# Patient Record
Sex: FEMALE | Race: WHITE | NOT HISPANIC OR LATINO
[De-identification: names, ages, dates, MRNs, and addresses within clinical notes are randomized per-mention and may not be internally consistent; named-entity substitution may affect disease eponyms.]

---

## 2017-11-14 ENCOUNTER — APPOINTMENT (OUTPATIENT)
Dept: SURGICAL ONCOLOGY | Facility: CLINIC | Age: 57
End: 2017-11-14
Payer: MEDICAID

## 2017-11-14 VITALS
HEIGHT: 62 IN | SYSTOLIC BLOOD PRESSURE: 114 MMHG | DIASTOLIC BLOOD PRESSURE: 80 MMHG | HEART RATE: 71 BPM | TEMPERATURE: 98.4 F | WEIGHT: 136 LBS | BODY MASS INDEX: 25.03 KG/M2

## 2017-11-14 DIAGNOSIS — L72.9 FOLLICULAR CYST OF THE SKIN AND SUBCUTANEOUS TISSUE, UNSPECIFIED: ICD-10-CM

## 2017-11-14 DIAGNOSIS — Z78.9 OTHER SPECIFIED HEALTH STATUS: ICD-10-CM

## 2017-11-14 DIAGNOSIS — Z80.7 FAMILY HISTORY OF OTHER MALIGNANT NEOPLASMS OF LYMPHOID, HEMATOPOIETIC AND RELATED TISSUES: ICD-10-CM

## 2017-11-14 DIAGNOSIS — G54.2 CERVICAL ROOT DISORDERS, NOT ELSEWHERE CLASSIFIED: ICD-10-CM

## 2017-11-14 PROCEDURE — 99205 OFFICE O/P NEW HI 60 MIN: CPT

## 2017-11-22 ENCOUNTER — RESULT REVIEW (OUTPATIENT)
Age: 57
End: 2017-11-22

## 2017-11-22 ENCOUNTER — LABORATORY RESULT (OUTPATIENT)
Age: 57
End: 2017-11-22

## 2017-11-22 ENCOUNTER — APPOINTMENT (OUTPATIENT)
Dept: SURGICAL ONCOLOGY | Facility: CLINIC | Age: 57
End: 2017-11-22
Payer: MEDICAID

## 2017-11-22 PROCEDURE — 21557 RESECT NECK THORAX TUMOR<5CM: CPT

## 2017-11-22 PROCEDURE — 14000 TIS TRNFR TRUNK 10 SQ CM/<: CPT

## 2017-11-22 PROCEDURE — 11600 EXC TR-EXT MAL+MARG 0.5 CM/<: CPT

## 2017-12-05 ENCOUNTER — APPOINTMENT (OUTPATIENT)
Dept: SURGICAL ONCOLOGY | Facility: CLINIC | Age: 57
End: 2017-12-05
Payer: MEDICAID

## 2017-12-05 VITALS
OXYGEN SATURATION: 97 % | HEART RATE: 83 BPM | DIASTOLIC BLOOD PRESSURE: 76 MMHG | HEIGHT: 62 IN | WEIGHT: 138 LBS | BODY MASS INDEX: 25.4 KG/M2 | SYSTOLIC BLOOD PRESSURE: 116 MMHG | TEMPERATURE: 98.3 F

## 2017-12-05 DIAGNOSIS — D22.9 MELANOCYTIC NEVI, UNSPECIFIED: ICD-10-CM

## 2017-12-05 PROCEDURE — 99214 OFFICE O/P EST MOD 30 MIN: CPT | Mod: 24

## 2019-03-11 ENCOUNTER — APPOINTMENT (OUTPATIENT)
Dept: SPINE | Facility: CLINIC | Age: 59
End: 2019-03-11
Payer: MEDICAID

## 2019-03-11 VITALS
DIASTOLIC BLOOD PRESSURE: 76 MMHG | HEIGHT: 62 IN | HEART RATE: 69 BPM | WEIGHT: 138 LBS | BODY MASS INDEX: 25.4 KG/M2 | SYSTOLIC BLOOD PRESSURE: 115 MMHG

## 2019-03-11 DIAGNOSIS — Z82.49 FAMILY HISTORY OF ISCHEMIC HEART DISEASE AND OTHER DISEASES OF THE CIRCULATORY SYSTEM: ICD-10-CM

## 2019-03-11 DIAGNOSIS — M54.2 CERVICALGIA: ICD-10-CM

## 2019-03-11 DIAGNOSIS — Z84.89 FAMILY HISTORY OF OTHER SPECIFIED CONDITIONS: ICD-10-CM

## 2019-03-11 DIAGNOSIS — M54.9 DORSALGIA, UNSPECIFIED: ICD-10-CM

## 2019-03-11 PROCEDURE — 99203 OFFICE O/P NEW LOW 30 MIN: CPT

## 2019-03-11 RX ORDER — PREGABALIN 50 MG/1
50 CAPSULE ORAL
Refills: 0 | Status: ACTIVE | COMMUNITY

## 2019-03-11 NOTE — ASSESSMENT
[FreeTextEntry1] : Cervical and lumbar axial pain without significant radiculopathy. Normal neurological exam. Incidental lower cervical upper thoracic syrinx. No need for any surgical intervention at the present time. She will return if she develops any significant numbness, tingling, weakness or gait disturbance. Regular exercise would be helpful.

## 2019-03-11 NOTE — REVIEW OF SYSTEMS
[Arthralgias] : arthralgias [Joint Pain] : joint pain [As Noted in HPI] : as noted in HPI [Itching] : itching [Negative] : Heme/Lymph [de-identified] : Headaches [FreeTextEntry9] : weakness [de-identified] : karen.

## 2019-03-11 NOTE — PHYSICAL EXAM
[Person] : oriented to person [Place] : oriented to place [Time] : oriented to time [Short Term Intact] : short term memory intact [Remote Intact] : remote memory intact [Span Intact] : the attention span was normal [Concentration Intact] : normal concentrating ability [Fluency] : fluency intact [Comprehension] : comprehension intact [Current Events] : adequate knowledge of current events [Past History] : adequate knowledge of personal past history [Vocabulary] : adequate range of vocabulary [Cranial Nerves Optic (II)] : visual acuity intact bilaterally,  pupils equal round and reactive to light [Cranial Nerves Oculomotor (III)] : extraocular motion intact [Cranial Nerves Trigeminal (V)] : facial sensation intact symmetrically [Cranial Nerves Facial (VII)] : face symmetrical [Cranial Nerves Vestibulocochlear (VIII)] : hearing was intact bilaterally [Cranial Nerves Glossopharyngeal (IX)] : tongue and palate midline [Cranial Nerves Accessory (XI - Cranial And Spinal)] : head turning and shoulder shrug symmetric [Cranial Nerves Hypoglossal (XII)] : there was no tongue deviation with protrusion [Motor Tone] : muscle tone was normal in all four extremities [Motor Strength] : muscle strength was normal in all four extremities [No Muscle Atrophy] : normal bulk in all four extremities [Sensation Tactile Decrease] : light touch was intact [Abnormal Walk] : normal gait [Balance] : balance was intact [Past-pointing] : there was no past-pointing [Tremor] : no tremor present [2+] : Ankle jerk left 2+ [Plantar Reflex Right Only] : normal on the right [Plantar Reflex Left Only] : normal on the left [___] : absent on the right [___] : absent on the left [Cavanaugh] : Cavanaugh's sign was not demonstrated [L'Hermitte's] : neck flexion did not produce tingling down the spine/arms [Spurling's - Opposite Side] : Negative Spurling's on opposite side [Spurling's Same Side] : Negative Spurling's on same side [No Tenderness to Palpation] : no spine tenderness on palpation [Straight-Leg Raise Test - Left] : straight leg raise of the left leg was negative [Straight-Leg Raise Test - Right] : straight leg raise  of the right leg was negative [Able to toe walk] : the patient was able to toe walk [Able to heel walk] : the patient was able to heel walk

## 2019-03-11 NOTE — DATA REVIEWED
[de-identified] : Cervical done at [facility] on 2/11/2019.  [place] [facility] [place]

## 2019-03-11 NOTE — HISTORY OF PRESENT ILLNESS
[> 3 months] : more  than 3 months [FreeTextEntry1] : neck and back pain [de-identified] : A 59-year-old woman who has had a year and a half of neck and back pain. There is no history of trauma. She has rare numbness and tingling in the right hand. She does not describe any weakness, bowel, bladder or gait disturbance. EMG testing showed cervical radiculopathy. She has not had any physical therapy. An MRI scan of the cervical spine showed C5-6 disc osteophyte complex with mild central and foraminal stenosis. There is no significant cord compression. It also showed an upper thoracic lower cervical syrinx which is non-expansile. There is no Chiari malformation. An MRI of the thoracic spine shows degenerative changes at multiple levels with a questionable arachnoid cyst that T3-4. There is no enhancement with contrast administration.Her symptoms have been significantly benefited her Lyrica.

## 2019-03-11 NOTE — CONSULT LETTER
[Dear  ___] : Dear  [unfilled], [Consult Letter:] : I had the pleasure of evaluating your patient, [unfilled]. [Please see my note below.] : Please see my note below. [Consult Closing:] : Thank you very much for allowing me to participate in the care of this patient.  If you have any questions, please do not hesitate to contact me. [Sincerely,] : Sincerely, [FreeTextEntry3] : Yifan Roberson M.D.

## 2019-03-11 NOTE — REASON FOR VISIT
[New Patient Visit] : a new patient visit [Referred By: _________] : Patient was referred by [facility] [FreeTextEntry1] : The patientstated that she started with neck and thoracic pain a year and a half ago.  She was sent for physical therapy of her cervical spine.  She saw DR. Pricila Fernandez who did EMGs of her upper extremities and ordered MRIs of the cervical and thoracic spine which she is here for review.  She was found to have a small syrinx at C4-C5.

## 2019-03-25 ENCOUNTER — TRANSCRIPTION ENCOUNTER (OUTPATIENT)
Age: 59
End: 2019-03-25

## 2021-02-03 ENCOUNTER — EMERGENCY (EMERGENCY)
Facility: HOSPITAL | Age: 61
LOS: 1 days | Discharge: HOME | End: 2021-02-03
Attending: EMERGENCY MEDICINE
Payer: COMMERCIAL

## 2021-02-03 VITALS
HEIGHT: 62 IN | TEMPERATURE: 99 F | RESPIRATION RATE: 18 BRPM | HEART RATE: 86 BPM | SYSTOLIC BLOOD PRESSURE: 162 MMHG | OXYGEN SATURATION: 97 % | WEIGHT: 154.32 LBS | DIASTOLIC BLOOD PRESSURE: 96 MMHG

## 2021-02-03 PROCEDURE — 99282 EMERGENCY DEPT VISIT SF MDM: CPT

## 2021-02-03 NOTE — ED ADULT NURSE NOTE - CAS ELECT INFOMATION PROVIDED
seen, treated, and released by Dr. Watkins in intake. No nursing intervention required./DC instructions

## 2021-02-03 NOTE — ED PROVIDER NOTE - CLINICAL SUMMARY MEDICAL DECISION MAKING FREE TEXT BOX
61 year old female presenting with bilateral foot pain and swelling. Exam and history more consistent with venous stasis even if there was a clot in the foot area. Those are not concerning for DVT/PE. Will instruct the patient to elevate the extremity as needed and to do exercises while sitting and follow up with a vascular doctor.

## 2021-02-03 NOTE — ED ADULT TRIAGE NOTE - CHIEF COMPLAINT QUOTE
Presents for BLE swelling since last night, R worse than left. Denies cp, sob. Was covid positive in 05/2020

## 2021-02-03 NOTE — ED PROVIDER NOTE - NSFOLLOWUPINSTRUCTIONS_ED_ALL_ED_FT
Venous Insufficiency    WHAT YOU NEED TO KNOW:    What is venous insufficiency? Venous insufficiency is a condition that prevents blood from flowing out of your legs and back to your heart. Veins contain valves that help blood flow in one direction. Venous insufficiency means the valves do not close correctly or fully. Blood flows back and pools in your leg. This can cause problems such as varicose veins. Venous insufficiency may also be called chronic venous insufficiency or venous stasis.    What increases my risk for venous insufficiency?   •A leg injury or blood clot      •Being a woman      •Pregnancy      •Older age      •A family history of varicose veins      •Smoking cigarettes      •Obesity, or not getting enough exercise      What are the signs and symptoms of venous insufficiency?   •Visible veins on your legs that may be small and red or large, thick, and blue      •Swelling in your ankles or calves      •Changes in skin color, such as dark or purple skin      •An ulcer (open sore) on your leg      •Leg pain that is worse when you are menstruating (women) or when you stand, and better when you elevate your legs      •Burning or itching      •Cramps that happen at night      •Thick, hard skin on your legs and ankles      •Feeling of heaviness in your legs      How is venous insufficiency diagnosed?   •Venous duplex imaging is a procedure used to examine the blood flow through veins. A gel will be applied to your legs. Your healthcare provider will slide a small device called a transducer across the veins. The transducer is a microphone that helps your healthcare provider hear blood moving through the vein.      •Contrast venography is a procedure used to show the veins on x-ray pictures. A catheter is guided into the vein. Contrast liquid is injected into the catheter to help the veins show up better in the pictures. Tell the healthcare provider if you have ever had an allergic reaction to contrast liquid.      •Plethysmography is a procedure that may be used to find changes in blood pressure through your veins. You will wear a blood pressure cuff on your leg. Changes in pressure and the amount of blood that can circulate through your leg veins are measured. Pressures are measured while you stand, sit, and lift your leg.       How is venous insufficiency treated?   •Medicine may be given to improve blood flow. The medicines may thin your blood or reduce swelling to help blood flow. You may also need medicine to treat a bacterial infection.      •Ablation is a procedure used to close varicose veins. A catheter is guided until it is near the vein. A device will then be guided to the area. The device may produce energy through radiofrequency or a laser. The energy creates heat that will close the blood vessel.       •Sclerotherapy is a procedure used to fade visible veins. Your healthcare provider will inject a liquid into a spider vein or varicose vein. The liquid causes irritation in the vein. The vein swells and sticks together. Your body will then absorb the vein.      •Surgery may be needed if other treatments do not work. Surgery may be used to repair a leg vein valve or to clip or tie off a vein so blood cannot flow through it. You may need to have a veins removed during surgery called stripping. Surgery may be used to bypass (go around) the damaged vein. Blood will flow through a vein transplanted from another part of your body.      What can I do to manage my symptoms?   •Wear pressure stockings as directed. Pressure stockings help keep blood from pooling in your leg veins. Your healthcare provider can prescribe stockings that are right for you. Do not buy over-the-counter pressure stockings unless your healthcare provider says it is okay. They may not fit correctly or may have elastic that cuts off your circulation. Ask your healthcare provider when to start wearing pressure stockings and how long to wear them each day.   Pressure Stockings            •Do not sit or stand for long periods of time. If you have to sit for a long time, flex and extend your legs, feet, and ankles. Do this about 10 times every 30 minutes to help keep blood flowing. If you have to stand for a long time, take breaks and sit with your legs elevated.      •Elevate your legs. Elevate your legs above the level of your heart to reduce swelling. Your healthcare provider may recommend that you keep your legs elevated for 30 minutes at a time. You may need to do this 3 to 4 times per day, or more if your healthcare provider recommends.      •Do not smoke. Nicotine and other chemicals in cigarettes and cigars can cause blood vessel damage. Ask your healthcare provider for information if you currently smoke and need help to quit. E-cigarettes or smokeless tobacco still contain nicotine. Talk to your healthcare provider before you use these products.       •Reach or maintain a healthy weight. Extra weight can make venous insufficiency worse. Ask your healthcare provider how much you should weigh. He can help you create a weight loss plan if you need to lose weight.      •Exercise as directed. Walking can help increase blood flow in your calves. Ask your healthcare provider how much exercise you need each day and which exercises are best for you.      •Care for your skin. Keep your skin clean. Do not use any soaps or lotions that may dry your skin. For example, do not use products that contain fragrance or alcohol. If you have a skin ulcer, your healthcare provider may recommend a wet-to-dry bandage. To do this, apply a wet bandage to your wound and allow it to dry. This will help remove drainage from your wound each time you change the bandage. Your healthcare provider will tell you how often to change your bandage and which kind of bandage to use. Check your wound for signs of infection, such as swelling or pus.      •Go to physical therapy (PT) as directed. A physical therapist can help you increase movement and range of motion in your legs.      When should I seek immediate care?   •You have a wound that does not heal or is infected.      •You have an injury that has broken your skin and caused your varicose veins to bleed.      •Your leg is swollen and hard.      •You have pain in your leg that does not go away or gets worse.      •Your legs or feet are turning blue or black.      •Your leg feels warm, tender, and painful. It may look swollen and red.      When should I contact my healthcare provider?   •You have a fever.      •You have varicose veins and they are painful.      •You have new or worsening leg pain, swelling, or redness.      •You have new or worsening ulcers or other sores on your leg.      •You have questions or concerns about your condition or care.      CARE AGREEMENT:    You have the right to help plan your care. Learn about your health condition and how it may be treated. Discuss treatment options with your healthcare providers to decide what care you want to receive. You always have the right to refuse treatment.

## 2021-02-03 NOTE — ED PROVIDER NOTE - OBJECTIVE STATEMENT
61 year old female, recently recovered from COVID-19, with PMHx of multiple level spinal neuropathy, on Lyrica presenting to the ED with complaints of bilateral foot/ankle pain and swelling that improved this morning. Her neurologist told her to come into the ED since she had a history of COVID-19 in order to r/o DVT. No reported trauma, no shooting electrical pain from back to her toes, no incontinence, or any other acute complaints. 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

## 2021-02-03 NOTE — ED PROVIDER NOTE - PATIENT PORTAL LINK FT
You can access the FollowMyHealth Patient Portal offered by [facility] by registering at the following website: [email or web address]. By joining [facility] FollowMyHealth portal, you will also be able to view your health information using other applications (apps) compatible with our system.

## 2021-02-03 NOTE — ED PROVIDER NOTE - PHYSICAL EXAMINATION
No swelling appreciated. Both sides appear equal. Pulses intact. No calf tenderness. No erythema. Patient is ambulatory.